# Patient Record
Sex: FEMALE | Race: WHITE | Employment: OTHER | URBAN - METROPOLITAN AREA
[De-identification: names, ages, dates, MRNs, and addresses within clinical notes are randomized per-mention and may not be internally consistent; named-entity substitution may affect disease eponyms.]

---

## 2019-05-07 PROBLEM — G89.29 CHRONIC BACK PAIN: Status: ACTIVE | Noted: 2019-05-07

## 2019-05-07 PROBLEM — G62.9 NEUROPATHY: Status: ACTIVE | Noted: 2019-05-07

## 2019-05-07 PROBLEM — E11.9 DIABETES (HCC): Status: ACTIVE | Noted: 2019-05-07

## 2019-05-07 PROBLEM — F02.80 ALZHEIMER DISEASE (HCC): Status: ACTIVE | Noted: 2019-05-07

## 2019-05-07 PROBLEM — F41.9 ANXIETY: Status: ACTIVE | Noted: 2019-05-07

## 2019-05-07 PROBLEM — R25.2 LEG CRAMPS: Status: ACTIVE | Noted: 2019-05-07

## 2019-05-07 PROBLEM — F32.A DEPRESSION: Status: ACTIVE | Noted: 2019-05-07

## 2019-05-07 PROBLEM — M54.9 CHRONIC BACK PAIN: Status: ACTIVE | Noted: 2019-05-07

## 2019-05-07 PROBLEM — G30.9 ALZHEIMER DISEASE (HCC): Status: ACTIVE | Noted: 2019-05-07

## 2019-05-07 PROBLEM — I49.9 IRREGULAR HEARTBEAT: Status: ACTIVE | Noted: 2019-05-07

## 2019-05-07 PROBLEM — M19.90 OSTEOARTHRITIS: Status: ACTIVE | Noted: 2019-05-07

## 2019-05-07 PROBLEM — J44.9 COPD (CHRONIC OBSTRUCTIVE PULMONARY DISEASE) (HCC): Status: ACTIVE | Noted: 2019-05-07

## 2019-05-07 PROBLEM — J45.909 ASTHMA: Status: ACTIVE | Noted: 2019-05-07

## 2019-05-07 PROBLEM — I10 HTN (HYPERTENSION): Status: ACTIVE | Noted: 2019-05-07

## 2019-05-07 PROBLEM — K21.9 GERD (GASTROESOPHAGEAL REFLUX DISEASE): Status: ACTIVE | Noted: 2019-05-07

## 2019-05-07 PROBLEM — G47.30 SLEEP APNEA: Status: ACTIVE | Noted: 2019-05-07

## 2019-10-04 ENCOUNTER — HOSPITAL ENCOUNTER (OUTPATIENT)
Dept: LAB | Age: 85
Discharge: HOME | End: 2019-10-04

## 2019-10-05 LAB
HBV SURFACE AG SERPL QL IA: NEGATIVE
HCV AB S/CO SERPL IA: <0.1 S/CO RATIO (ref 0–0.9)
HCV AB SERPL QL IA: NORMAL
HIV1 P24 AG SERPL QL IA: NONREACTIVE
HIV1+2 AB SERPL QL IA: NONREACTIVE

## 2022-03-18 PROBLEM — R25.2 LEG CRAMPS: Status: ACTIVE | Noted: 2019-05-07

## 2022-03-19 PROBLEM — I10 HTN (HYPERTENSION): Status: ACTIVE | Noted: 2019-05-07

## 2022-03-19 PROBLEM — G62.9 NEUROPATHY: Status: ACTIVE | Noted: 2019-05-07

## 2022-03-19 PROBLEM — E11.9 DIABETES (HCC): Status: ACTIVE | Noted: 2019-05-07

## 2022-03-19 PROBLEM — M54.9 CHRONIC BACK PAIN: Status: ACTIVE | Noted: 2019-05-07

## 2022-03-19 PROBLEM — G30.9 ALZHEIMER DISEASE (HCC): Status: ACTIVE | Noted: 2019-05-07

## 2022-03-19 PROBLEM — F02.80 ALZHEIMER DISEASE (HCC): Status: ACTIVE | Noted: 2019-05-07

## 2022-03-19 PROBLEM — J44.9 COPD (CHRONIC OBSTRUCTIVE PULMONARY DISEASE) (HCC): Status: ACTIVE | Noted: 2019-05-07

## 2022-03-19 PROBLEM — M19.90 OSTEOARTHRITIS: Status: ACTIVE | Noted: 2019-05-07

## 2022-03-19 PROBLEM — F41.9 ANXIETY: Status: ACTIVE | Noted: 2019-05-07

## 2022-03-19 PROBLEM — I49.9 IRREGULAR HEARTBEAT: Status: ACTIVE | Noted: 2019-05-07

## 2022-03-19 PROBLEM — J45.909 ASTHMA: Status: ACTIVE | Noted: 2019-05-07

## 2022-03-19 PROBLEM — G89.29 CHRONIC BACK PAIN: Status: ACTIVE | Noted: 2019-05-07

## 2022-03-19 PROBLEM — K21.9 GERD (GASTROESOPHAGEAL REFLUX DISEASE): Status: ACTIVE | Noted: 2019-05-07

## 2022-03-20 PROBLEM — G47.30 SLEEP APNEA: Status: ACTIVE | Noted: 2019-05-07

## 2022-03-20 PROBLEM — F32.A DEPRESSION: Status: ACTIVE | Noted: 2019-05-07

## 2025-01-24 ENCOUNTER — HOSPITAL ENCOUNTER (EMERGENCY)
Age: 89
Discharge: HOME | End: 2025-01-25
Attending: EMERGENCY MEDICINE
Payer: MEDICARE

## 2025-01-24 ENCOUNTER — APPOINTMENT (OUTPATIENT)
Dept: GENERAL RADIOLOGY | Age: 89
End: 2025-01-24
Payer: MEDICARE

## 2025-01-24 ENCOUNTER — APPOINTMENT (OUTPATIENT)
Dept: CT IMAGING | Age: 89
End: 2025-01-24
Payer: MEDICARE

## 2025-01-24 DIAGNOSIS — M51.369 DEGENERATION OF INTERVERTEBRAL DISC OF LUMBAR REGION, UNSPECIFIED WHETHER PAIN PRESENT: ICD-10-CM
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
## 2025-01-24 DIAGNOSIS — M16.0 OSTEOARTHRITIS OF BOTH HIPS, UNSPECIFIED OSTEOARTHRITIS TYPE: ICD-10-CM

## 2025-01-24 PROCEDURE — 99284 EMERGENCY DEPT VISIT MOD MDM: CPT

## 2025-01-24 PROCEDURE — 73522 X-RAY EXAM HIPS BI 3-4 VIEWS: CPT

## 2025-01-24 PROCEDURE — 70450 CT HEAD/BRAIN W/O DYE: CPT

## 2025-01-24 PROCEDURE — 72100 X-RAY EXAM L-S SPINE 2/3 VWS: CPT

## 2025-01-25 VITALS
HEART RATE: 64 BPM | OXYGEN SATURATION: 97 % | TEMPERATURE: 98.7 F | WEIGHT: 165 LBS | SYSTOLIC BLOOD PRESSURE: 102 MMHG | RESPIRATION RATE: 16 BRPM | DIASTOLIC BLOOD PRESSURE: 64 MMHG

## 2025-01-25 NOTE — FLOWSHEET NOTE
Patient mobility status  with no difficulty.     I have reviewed discharge instructions with the patient.  The patient verbalized understanding.    Patient left ED via Discharge Method: stretcher to Home with  ems .    Opportunity for questions and clarification provided.     Patient given 0 scripts.

## 2025-01-25 NOTE — DISCHARGE INSTRUCTIONS
Your evaluation here in the emergency department is reassuring.  There is no evidence of acute fracture.  Return the emergency department for any worsening of symptoms.  Otherwise follow-up with your primary care doctor.

## 2025-01-25 NOTE — ED TRIAGE NOTES
Pt from [facility] in [place], fall right hip pain, ambulatory  and ha  x 1 Ha , sinus pressure,

## 2025-01-25 NOTE — ED PROVIDER NOTES
Emergency Department Provider Note       PCP: Ismael Benedict MD   Age: 89 y.o.   Sex: female     DISPOSITION Decision To Discharge 01/25/2025 12:01:06 AM    ICD-10-CM    1. Fall, initial encounter  W19.XXXA       2. Degeneration of intervertebral disc of lumbar region, unspecified whether pain present  M51.369       3. Osteoarthritis of both hips, unspecified osteoarthritis type  M16.0           Medical Decision Making     Patient is well-appearing, she is able to maneuver in the bed without any difficulty.  She is ambulated in the emergency department without any trouble.  She has no localized tenderness of her lumbar spine.  I do not think advanced imaging of her lumbar spine is necessary at this time.  I have low suspicion for occult fracture given her ability to walk with minimal discomfort.  Will plan to discharge home at this time.  Follow-up with primary care, return to ER for worsening of symptoms     1 acute, uncomplicated illness or injury.  Patient was discharged risks and benefits of hospitalization were considered.  Shared medical decision making was utilized in creating the patients health plan today.  Considerations: The following items were considered but not ordered: CT/MRI not indicated at this time.  I independently ordered and reviewed each unique test.    I reviewed external records: provider visit note from PCP.  I reviewed external records: provider visit note from outside specialist.   The patients assessment required an independent historian: EMS.  The reason they were needed is dementia.  ED cardiac monitoring rhythm strip was ordered and interpreted:  sinus rhythm, no evidence of an arrhythmia  ST Segments:Normal ST segments - NO STEMI   Rate:   I interpreted the X-rays no obvious fractures, degenerative changes noted.  I interpreted the CT Scan no ICH.              History     This is a 89-year-old female with a history of essential tremor presented to the emergency department  the pelvis, pelvis stable to AP and lateral compression, there are no step-offs or deformities of the thoracic and lumbar spine, there is no midline tenderness to palpation.   Skin:     General: Skin is dry.      Findings: No rash.   Neurological:      General: No focal deficit present.      Mental Status: She is alert and oriented to person, place, and time. Mental status is at baseline.      GCS: GCS eye subscore is 4. GCS verbal subscore is 5. GCS motor subscore is 6.      Cranial Nerves: Cranial nerves 2-12 are intact. No cranial nerve deficit, dysarthria or facial asymmetry.      Sensory: Sensation is intact. No sensory deficit.      Motor: Motor function is intact. No weakness.      Coordination: Coordination is intact. Romberg sign negative. Finger-Nose-Finger Test normal.      Gait: Gait is intact. Gait normal.   Psychiatric:         Mood and Affect: Mood normal.         Behavior: Behavior normal.          Procedures     Procedures    Orders placed during this emergency department visit:     Orders Placed This Encounter   Procedures    XR HIP 3-4 VW W PELVIS BILATERAL    XR LUMBAR SPINE (2-3 VIEWS)    CT HEAD WO CONTRAST    POCT Urine Dipstick        Medications given during this emergency department visit:   Medications - No data to display    New prescriptions:     New Prescriptions    No medications on file        Past History and Complexity:     Past Medical History:   Diagnosis Date    Arthritis     CAD (coronary artery disease)     PT STATES no stents, no CABG    Cancer (HCC)     breast, left, 1980's, mastectomy    Chronic obstructive pulmonary disease (HCC)     daily neb, CPAP @ night w/ 2L    Depression     daily meds    Diabetes (HCC)     pt denies, no medications    Gastrointestinal disorder     GERD (gastroesophageal reflux disease)     daily meds    Hypertension     Kidney stone     Neuropathy     Other ill-defined conditions(799.89)     neuropathy    PUD (peptic ulcer disease)     Seizures ([facility])